# Patient Record
Sex: MALE | Race: BLACK OR AFRICAN AMERICAN | NOT HISPANIC OR LATINO | Employment: FULL TIME | URBAN - METROPOLITAN AREA
[De-identification: names, ages, dates, MRNs, and addresses within clinical notes are randomized per-mention and may not be internally consistent; named-entity substitution may affect disease eponyms.]

---

## 2017-05-12 ENCOUNTER — OFFICE VISIT (OUTPATIENT)
Dept: INTERNAL MEDICINE | Facility: CLINIC | Age: 27
End: 2017-05-12
Payer: COMMERCIAL

## 2017-05-12 VITALS
HEART RATE: 61 BPM | DIASTOLIC BLOOD PRESSURE: 78 MMHG | BODY MASS INDEX: 25.56 KG/M2 | SYSTOLIC BLOOD PRESSURE: 110 MMHG | WEIGHT: 192.88 LBS | OXYGEN SATURATION: 98 % | HEIGHT: 73 IN

## 2017-05-12 DIAGNOSIS — B36.0 TINEA VERSICOLOR: ICD-10-CM

## 2017-05-12 DIAGNOSIS — R10.9 ABDOMINAL PAIN, UNSPECIFIED LOCATION: Primary | ICD-10-CM

## 2017-05-12 DIAGNOSIS — R05.9 COUGH: ICD-10-CM

## 2017-05-12 PROCEDURE — 99213 OFFICE O/P EST LOW 20 MIN: CPT | Mod: S$GLB,,, | Performed by: NURSE PRACTITIONER

## 2017-05-12 PROCEDURE — 99999 PR PBB SHADOW E&M-EST. PATIENT-LVL III: CPT | Mod: PBBFAC,,, | Performed by: NURSE PRACTITIONER

## 2017-05-12 PROCEDURE — 1160F RVW MEDS BY RX/DR IN RCRD: CPT | Mod: S$GLB,,, | Performed by: NURSE PRACTITIONER

## 2017-05-12 RX ORDER — PANTOPRAZOLE SODIUM 20 MG/1
20 TABLET, DELAYED RELEASE ORAL
Qty: 42 TABLET | Refills: 0 | Status: SHIPPED | OUTPATIENT
Start: 2017-05-12 | End: 2017-08-29

## 2017-05-12 RX ORDER — FLUTICASONE PROPIONATE 50 MCG
1 SPRAY, SUSPENSION (ML) NASAL DAILY
Qty: 1 BOTTLE | Refills: 0 | Status: SHIPPED | OUTPATIENT
Start: 2017-05-12 | End: 2017-08-29

## 2017-05-12 RX ORDER — KETOCONAZOLE 200 MG/1
TABLET ORAL
Qty: 2 TABLET | Refills: 0 | Status: SHIPPED | OUTPATIENT
Start: 2017-05-12 | End: 2017-08-29

## 2017-05-12 RX ORDER — KETOCONAZOLE 20 MG/ML
SHAMPOO, SUSPENSION TOPICAL
Qty: 120 ML | Status: SHIPPED | OUTPATIENT
Start: 2017-05-15

## 2017-05-12 NOTE — MR AVS SNAPSHOT
[facility] - Internal Medicine  [address]  [facility] [address]  Phone: [phone]  Fax: [phone]                  Karl Bridges   2017 4:00 PM   Office Visit    Description:  Male : 1990   Provider:  Lu Carreon NP   Department:  [facility] - Internal Medicine           Reason for Visit     Abdominal Pain     Cough           Diagnoses this Visit        Comments    Abdominal pain, unspecified location    -  Primary     Tinea versicolor         Cough                To Do List           Future Appointments        Provider Department Dept Phone    2017 4:40 PM LAB, APPOINTMENT [facility] [phone]    2017 2:40 PM Jesse Tavares MD [facility] Internal Medicine [phone]      Goals (5 Years of Data)     None       These Medications        Disp Refills Start End    ketoconazole (NIZORAL) 2 % shampoo 120 mL prn 5/15/2017     Apply topically twice a week. - Topical (Top)    Pharmacy: [facility] [place] [address] & [facility] Ph #: [phone]       ketoconazole (NIZORAL) 200 mg Tab 2 tablet 0 2017     Take 2 pills at once. Exercise until sweating. Shower off after 8 hours    Pharmacy: [facility] [place] [address] & [facility] Ph #: [phone]       pantoprazole (PROTONIX) 20 MG tablet 42 tablet 0 2017    Take 1 tablet (20 mg total) by mouth 2 (two) times daily before meals. - Oral    Pharmacy: [facility] [place] [address] & [facility] Ph #: [phone]       fluticasone (FLONASE) 50 mcg/actuation nasal spray 1 Bottle 0 2017     1 spray by Each Nare route once daily. - Each Nare    Pharmacy: [facility] [place] [address] & [facility] #: [phone]         PURCHASE these  Medications (No prescription required)        Start End    Bifidobacterium infantis (ALIGN) 4 mg Cap 2017     Sig - Route: Take 1 capsule by mouth once daily. - Oral    Class: OTC      [facility] On Call     [facility] On Call Nurse Care Line -  Assistance  Unless otherwise directed by your provider, please contact [facility], our nurse care line that is available for  assistance.     Registered nurses in the [facility] On Call Center provide: appointment scheduling, clinical advisement, health education, and other advisory services.  Call: [phone] (toll free)               Medications           START taking these NEW medications        Refills    pantoprazole (PROTONIX) 20 MG tablet 0    Sig: Take 1 tablet (20 mg total) by mouth 2 (two) times daily before meals.    Class: Normal    Route: Oral    Bifidobacterium infantis (ALIGN) 4 mg Cap     Sig: Take 1 capsule by mouth once daily.    Class: OTC    Route: Oral    fluticasone (FLONASE) 50 mcg/actuation nasal spray 0    Si spray by Each Nare route once daily.    Class: Normal    Route: Each Nare      CHANGE how you are taking these medications     Start Taking Instead of    ketoconazole (NIZORAL) 2 % shampoo ketoconazole (NIZORAL) 2 % shampoo    Dosage:  Apply topically twice a week. Dosage:  Apply topically twice a week.    Reason for Change:  Reorder     Starting on: 5/15/2017            Verify that the below list of medications is an accurate representation of the medications you are currently taking.  If none reported, the list may be blank. If incorrect, please contact your healthcare provider. Carry this list with you in case of emergency.           Current Medications     ketoconazole (NIZORAL) 2 % shampoo Starting on May 15, 2017. Apply topically twice a week.    ketoconazole (NIZORAL) 200 mg Tab Take 2 pills at once. Exercise until sweating. Shower off after 8 hours    Bifidobacterium infantis (ALIGN) 4 mg Cap Take 1 capsule by mouth once  "daily.    fluticasone (FLONASE) 50 mcg/actuation nasal spray 1 spray by Each Nare route once daily.    pantoprazole (PROTONIX) 20 MG tablet Take 1 tablet (20 mg total) by mouth 2 (two) times daily before meals.           Clinical Reference Information           Your Vitals Were     BP Pulse Height Weight SpO2 BMI    110/78 (BP Location: Right arm, Patient Position: Sitting, BP Method: Manual) 61 6' 1" (1.854 m) 87.5 kg (192 lb 14.4 oz) 98% 25.45 kg/m2      Blood Pressure          Most Recent Value    BP  110/78      Allergies as of 5/12/2017     No Known Allergies      Immunizations Administered on Date of Encounter - 5/12/2017     None      Orders Placed During Today's Visit     Future Labs/Procedures Expected by Expires    Amylase  5/12/2017 7/11/2018    CBC auto differential  5/12/2017 7/11/2018    Comprehensive metabolic panel  5/12/2017 7/11/2018    Lipase  5/12/2017 7/11/2018    TSH  5/12/2017 7/11/2018      Language Assistance Services     ATTENTION: Language assistance services are available, free of charge. Please call [phone].      ATENCIÓN: Si habla español, tiene a veronica disposición servicios gratuitos de asistencia lingüística. Llame al [phone].     CATHRYN Ý: N?u b?n nói Ti?ng Vi?t, có các d?ch v? h? tr? ngôn ng? mi?n phí dành cho b?n. G?i s? [phone].         [facility] - [facility] complies with applicable Federal civil rights laws and does not discriminate on the basis of race, color, national origin, age, disability, or sex.        "

## 2017-05-12 NOTE — PROGRESS NOTES
"INTERNAL MEDICINE URGENT CARE NOTE    CHIEF COMPLAINT     Chief Complaint   Patient presents with    Abdominal Pain     x1week    Cough     x1 month       HPI     Karl Bridges is a 27 y.o. male who presents for an urgent visit today with c/o abd pain.     Abd pain- Began 1 week ago worse after weekend. Located to the center of the abd. Describes as "hunger pains". Pain constant but waxes and wanes. Denies changes with food. Denies aggravating factors. Denies relieving factors. Associated with heartburn with regurgitation. No treatment.     Cough- x 1 month . Started with URI but those s/s resolved but cough persists. Worse with abd pain. Occasional post-tussive vomiting.       Past Medical History:  History reviewed. No pertinent past medical history.    Home Medications:  Prior to Admission medications    Medication Sig Start Date End Date Taking? Authorizing Provider   ketoconazole (NIZORAL) 2 % shampoo Apply topically twice a week. 5/22/14  Yes Jagjit Barry MD   ketoconazole (NIZORAL) 200 mg Tab Take 2 pills at once. Exercise until sweating. Shower off after 8 hours 5/21/14  Yes Jagjit Barry MD       Review of Systems:  Review of Systems    Health Maintainence:   Immunizations:  Health Maintenance       Date Due Completion Date    Lipid Panel 1990 ---    TETANUS VACCINE 2/28/2010 ---    Influenza Vaccine 8/1/2017 ---           PHYSICAL EXAM     /78 (BP Location: Right arm, Patient Position: Sitting, BP Method: Manual)  Pulse 61  Ht 6' 1" (1.854 m)  Wt 87.5 kg (192 lb 14.4 oz)  SpO2 98%  BMI 25.45 kg/m2    Physical Exam   Constitutional: He appears well-developed and well-nourished. No distress.   HENT:   Head: Normocephalic.   Right Ear: No mastoid tenderness. Tympanic membrane is erythematous. Tympanic membrane is not perforated, not retracted and not bulging.   Left Ear: No mastoid tenderness. Tympanic membrane is not perforated, not retracted and not bulging. "   Mouth/Throat: Uvula is midline, oropharynx is clear and moist and mucous membranes are normal. No tonsillar exudate.   turbs swollen    Eyes: Pupils are equal, round, and reactive to light.   Neck: Normal range of motion. Neck supple. No thyromegaly present.   Cardiovascular: Normal rate, regular rhythm, normal heart sounds and intact distal pulses.    Pulmonary/Chest: Effort normal and breath sounds normal.   Abdominal: Soft. Bowel sounds are normal. He exhibits no distension and no mass. There is no tenderness. There is no rebound, no guarding, no tenderness at McBurney's point and negative Andrade's sign. No hernia.       Lymphadenopathy:     He has no cervical adenopathy.   Skin: He is not diaphoretic.   Vitals reviewed.      LABS     No results found for: LABA1C, HGBA1C  CMP  No results found for: NA, K, CL, CO2, GLU, BUN, CREATININE, CALCIUM, PROT, ALBUMIN, BILITOT, ALKPHOS, AST, ALT, ANIONGAP, ESTGFRAFRICA, EGFRNONAA  No results found for: WBC, HGB, HCT, MCV, PLT  No results found for: CHOL  No results found for: HDL  No results found for: LDLCALC  No results found for: TRIG  No results found for: CHOLHDL  No results found for: TSH, J3MPKZP, D5KBRVI, THYROIDAB    ASSESSMENT/PLAN     Karl Bridges is a 27 y.o. male with  History reviewed. No pertinent past medical history.    Abdominal pain, unspecified location- GERD vs post-viral IBS. Will start probitoic. Start pantoprazole bid x 21 days. Labs today. Phsyical exam benign. Do not lay down for at least 2 hours after eating. Eat smaller, more frequent meals. Avoid trigger foods such as red sauce, caffeine, fatty foods, carbonated beverages, alcohol. Avoid NSAIDs. Elevated HOB 30 degrees, using books or blocks.     -     CBC auto differential; Future; Expected date: 5/12/17  -     Comprehensive metabolic panel; Future; Expected date: 5/12/17  -     TSH; Future; Expected date: 5/12/17  -     Lipase; Future; Expected date: 5/12/17  -     Amylase;  Future; Expected date: 5/12/17  -     pantoprazole (PROTONIX) 20 MG tablet; Take 1 tablet (20 mg total) by mouth 2 (two) times daily before meals.  Dispense: 42 tablet; Refill: 0  -     Bifidobacterium infantis (ALIGN) 4 mg Cap; Take 1 capsule by mouth once daily.    Tinea versicolor- will restart shampoo and pill as directed.   -     ketoconazole (NIZORAL) 2 % shampoo; Apply topically twice a week.  Dispense: 120 mL; Refill: prn  -     ketoconazole (NIZORAL) 200 mg Tab; Take 2 pills at once. Exercise until sweating. Shower off after 8 hours  Dispense: 2 tablet; Refill: 0    Cough- likely AR vs 2/2 gerd. Will start floanse as directed.   -     fluticasone (FLONASE) 50 mcg/actuation nasal spray; 1 spray by Each Nare route once daily.  Dispense: 1 Bottle; Refill: 0      Follow up as needed.   Follow up with PCP in 1 year for annual     Patient education provided from [facility]. Patient was counseled on when and how to seek emergent care.       Lu MEEKS, APRN, FNP-c   Department of Internal Medicine - [facility]  4:12 PM

## 2017-05-15 ENCOUNTER — PATIENT MESSAGE (OUTPATIENT)
Dept: INTERNAL MEDICINE | Facility: CLINIC | Age: 27
End: 2017-05-15

## 2017-05-15 DIAGNOSIS — R10.9 ABDOMINAL PAIN, UNSPECIFIED LOCATION: ICD-10-CM

## 2017-08-29 ENCOUNTER — LAB VISIT (OUTPATIENT)
Dept: LAB | Facility: OTHER | Age: 27
End: 2017-08-29
Attending: INTERNAL MEDICINE
Payer: COMMERCIAL

## 2017-08-29 ENCOUNTER — OFFICE VISIT (OUTPATIENT)
Dept: INTERNAL MEDICINE | Facility: CLINIC | Age: 27
End: 2017-08-29
Payer: COMMERCIAL

## 2017-08-29 VITALS
DIASTOLIC BLOOD PRESSURE: 76 MMHG | WEIGHT: 188.94 LBS | OXYGEN SATURATION: 98 % | HEIGHT: 73 IN | BODY MASS INDEX: 25.04 KG/M2 | SYSTOLIC BLOOD PRESSURE: 110 MMHG | HEART RATE: 57 BPM

## 2017-08-29 DIAGNOSIS — Z00.00 WELLNESS EXAMINATION: ICD-10-CM

## 2017-08-29 DIAGNOSIS — Z00.00 WELLNESS EXAMINATION: Primary | ICD-10-CM

## 2017-08-29 DIAGNOSIS — Z11.3 ROUTINE SCREENING FOR STI (SEXUALLY TRANSMITTED INFECTION): ICD-10-CM

## 2017-08-29 LAB
CHOLEST/HDLC SERPL: 3 {RATIO}
HDL/CHOLESTEROL RATIO: 33.1 %
HDLC SERPL-MCNC: 142 MG/DL
HDLC SERPL-MCNC: 47 MG/DL
LDLC SERPL CALC-MCNC: 84 MG/DL
NONHDLC SERPL-MCNC: 95 MG/DL
TRIGL SERPL-MCNC: 55 MG/DL

## 2017-08-29 PROCEDURE — 87591 N.GONORRHOEAE DNA AMP PROB: CPT

## 2017-08-29 PROCEDURE — 99385 PREV VISIT NEW AGE 18-39: CPT | Mod: S$GLB,,, | Performed by: INTERNAL MEDICINE

## 2017-08-29 PROCEDURE — 86592 SYPHILIS TEST NON-TREP QUAL: CPT

## 2017-08-29 PROCEDURE — 86703 HIV-1/HIV-2 1 RESULT ANTBDY: CPT

## 2017-08-29 PROCEDURE — 99999 PR PBB SHADOW E&M-EST. PATIENT-LVL III: CPT | Mod: PBBFAC,,, | Performed by: INTERNAL MEDICINE

## 2017-08-29 PROCEDURE — 36415 COLL VENOUS BLD VENIPUNCTURE: CPT

## 2017-08-29 PROCEDURE — 80061 LIPID PANEL: CPT

## 2017-08-29 RX ORDER — PANTOPRAZOLE SODIUM 20 MG/1
20 TABLET, DELAYED RELEASE ORAL
Qty: 42 TABLET | Refills: 0 | Status: CANCELLED | OUTPATIENT
Start: 2017-08-29 | End: 2017-09-19

## 2017-08-29 NOTE — PROGRESS NOTES
Subjective:       Patient ID: Karl Bridges is a 27 y.o. male.    Chief Complaint: Annual Exam    Pt here for annual exam and feels well. Just getting over URI but feels like symptoms are nearly resolved except for mild cough.     Counseled on exercise goals.       Cough   This is a recurrent problem. The current episode started more than 1 month ago. The problem has been waxing and waning. The problem occurs constantly. The cough is non-productive and productive of brown sputum. Associated symptoms include headaches and a sore throat. Pertinent negatives include no chest pain, fever, rash, rhinorrhea or shortness of breath. Nothing aggravates the symptoms. He has tried OTC cough suppressant for the symptoms. The treatment provided moderate relief.     Review of Systems   Constitutional: Negative for fatigue, fever and unexpected weight change.   HENT: Positive for sore throat. Negative for congestion and rhinorrhea.    Eyes: Negative for visual disturbance.   Respiratory: Positive for cough. Negative for shortness of breath.    Cardiovascular: Negative for chest pain, palpitations and leg swelling.   Gastrointestinal: Negative for abdominal pain, blood in stool, constipation and diarrhea.   Genitourinary: Negative for difficulty urinating and dysuria.   Skin: Negative for color change and rash.   Neurological: Positive for headaches. Negative for dizziness and syncope.   Hematological: Negative for adenopathy.   Psychiatric/Behavioral: Negative for dysphoric mood.       Objective:      Physical Exam   Constitutional: He is oriented to person, place, and time. He appears well-developed and well-nourished.   HENT:   Head: Normocephalic and atraumatic.   Right Ear: External ear normal.   Left Ear: External ear normal.   Mouth/Throat: Oropharynx is clear and moist. No oropharyngeal exudate.   Eyes: Conjunctivae and EOM are normal. Pupils are equal, round, and reactive to light.   Neck: Neck supple.  Carotid bruit is not present. No thyromegaly present.   Cardiovascular: Normal rate, regular rhythm, S1 normal, S2 normal, normal heart sounds and intact distal pulses.    Pulmonary/Chest: Effort normal and breath sounds normal.   Abdominal: Soft. Bowel sounds are normal. He exhibits no mass. There is no hepatosplenomegaly. There is no tenderness.   Musculoskeletal: He exhibits no edema.   Lymphadenopathy:     He has no cervical adenopathy.   Neurological: He is alert and oriented to person, place, and time. No cranial nerve deficit.   Psychiatric: He has a normal mood and affect. His behavior is normal.       Assessment:       1. Wellness examination    2. Routine screening for STI (sexually transmitted infection)        Plan:       1. Appropriate labs

## 2017-08-30 LAB
C TRACH DNA SPEC QL NAA+PROBE: NOT DETECTED
HIV 1+2 AB+HIV1 P24 AG SERPL QL IA: NEGATIVE
N GONORRHOEA DNA SPEC QL NAA+PROBE: NOT DETECTED
RPR SER QL: NORMAL

## 2018-01-10 ENCOUNTER — OFFICE VISIT (OUTPATIENT)
Dept: URGENT CARE | Facility: CLINIC | Age: 28
End: 2018-01-10

## 2018-01-10 VITALS
HEIGHT: 73 IN | DIASTOLIC BLOOD PRESSURE: 81 MMHG | HEART RATE: 89 BPM | WEIGHT: 176 LBS | BODY MASS INDEX: 23.33 KG/M2 | TEMPERATURE: 99 F | OXYGEN SATURATION: 98 % | SYSTOLIC BLOOD PRESSURE: 133 MMHG

## 2018-01-10 DIAGNOSIS — J32.9 SINUSITIS, UNSPECIFIED CHRONICITY, UNSPECIFIED LOCATION: Primary | ICD-10-CM

## 2018-01-10 DIAGNOSIS — R09.81 SINUS CONGESTION: ICD-10-CM

## 2018-01-10 PROCEDURE — 96372 THER/PROPH/DIAG INJ SC/IM: CPT | Mod: S$GLB,,, | Performed by: EMERGENCY MEDICINE

## 2018-01-10 PROCEDURE — 99203 OFFICE O/P NEW LOW 30 MIN: CPT | Mod: 25,S$GLB,, | Performed by: EMERGENCY MEDICINE

## 2018-01-10 RX ORDER — BETAMETHASONE SODIUM PHOSPHATE AND BETAMETHASONE ACETATE 3; 3 MG/ML; MG/ML
9 INJECTION, SUSPENSION INTRA-ARTICULAR; INTRALESIONAL; INTRAMUSCULAR; SOFT TISSUE
Status: COMPLETED | OUTPATIENT
Start: 2018-01-10 | End: 2018-01-10

## 2018-01-10 RX ORDER — AMOXICILLIN AND CLAVULANATE POTASSIUM 875; 125 MG/1; MG/1
1 TABLET, FILM COATED ORAL 2 TIMES DAILY
Qty: 14 TABLET | Refills: 0 | Status: SHIPPED | OUTPATIENT
Start: 2018-01-10 | End: 2018-01-17

## 2018-01-10 RX ADMIN — BETAMETHASONE SODIUM PHOSPHATE AND BETAMETHASONE ACETATE 9 MG: 3; 3 INJECTION, SUSPENSION INTRA-ARTICULAR; INTRALESIONAL; INTRAMUSCULAR; SOFT TISSUE at 01:01

## 2018-01-10 NOTE — PROGRESS NOTES
"Subjective:       Patient ID: Karl Bridges is a 27 y.o. male.    Vitals:  height is 6' 1" (1.854 m) and weight is 79.8 kg (176 lb). His oral temperature is 98.9 °F (37.2 °C). His blood pressure is 133/81 and his pulse is 89. His oxygen saturation is 98%.     Chief Complaint: Influenza    Pt also took Theraflu      Influenza   This is a new problem. The current episode started 1 to 4 weeks ago. The problem occurs daily. The problem has been gradually worsening. Associated symptoms include chills, congestion, coughing, fatigue, a fever, headaches, myalgias and a sore throat. Pertinent negatives include no abdominal pain, nausea or vomiting. He has tried NSAIDs for the symptoms. The treatment provided no relief.     Review of Systems   Constitution: Positive for chills, fatigue, fever, malaise/fatigue and night sweats.   HENT: Positive for congestion, ear pain (left), hoarse voice and sore throat.    Cardiovascular: Negative for dyspnea on exertion and leg swelling.   Respiratory: Positive for cough. Negative for shortness of breath, sputum production and wheezing.    Musculoskeletal: Positive for back pain (more than normal) and myalgias.   Gastrointestinal: Negative for abdominal pain, diarrhea, nausea and vomiting.   Neurological: Positive for headaches.       Objective:      Physical Exam   Constitutional: He is oriented to person, place, and time. He appears well-developed and well-nourished. He is cooperative.  Non-toxic appearance. He does not appear ill. No distress.   HENT:   Head: Normocephalic and atraumatic.   Right Ear: Hearing, tympanic membrane, external ear and ear canal normal.   Left Ear: Hearing, tympanic membrane, external ear and ear canal normal.   Nose: Mucosal edema and rhinorrhea present. No nasal deformity. No epistaxis. Right sinus exhibits frontal sinus tenderness (Mild tenderness.). Right sinus exhibits no maxillary sinus tenderness. Left sinus exhibits no maxillary sinus " tenderness and no frontal sinus tenderness.   Mouth/Throat: Uvula is midline and mucous membranes are normal. No trismus in the jaw. Normal dentition. No uvula swelling. Posterior oropharyngeal erythema present.   Eyes: Conjunctivae and lids are normal. Right eye exhibits no discharge. Left eye exhibits no discharge. No scleral icterus.   Sclera clear bilat   Neck: Trachea normal, normal range of motion, full passive range of motion without pain and phonation normal. Neck supple.   Few anterior nodes felt.   Cardiovascular: Normal rate, regular rhythm, normal heart sounds, intact distal pulses and normal pulses.    Pulmonary/Chest: Effort normal and breath sounds normal. No respiratory distress.   Abdominal: Soft. Normal appearance. He exhibits no distension, no pulsatile midline mass and no mass. There is no tenderness.   Musculoskeletal: Normal range of motion. He exhibits no edema or deformity.   Neurological: He is alert and oriented to person, place, and time. He exhibits normal muscle tone. Coordination normal.   Skin: Skin is warm, dry and intact. He is not diaphoretic. No pallor.   Psychiatric: He has a normal mood and affect. His speech is normal and behavior is normal. Judgment and thought content normal. Cognition and memory are normal.   Nursing note and vitals reviewed.      Assessment:       1. Sinusitis, unspecified chronicity, unspecified location    2. Sinus congestion        Plan:         Sinusitis, unspecified chronicity, unspecified location  -     betamethasone acetate-betamethasone sodium phosphate injection 9 mg; Inject 1.5 mLs (9 mg total) into the muscle one time.  -     amoxicillin-clavulanate 875-125mg (AUGMENTIN) 875-125 mg per tablet; Take 1 tablet by mouth 2 (two) times daily.  Dispense: 14 tablet; Refill: 0    Sinus congestion  -     POCT Influenza A/B

## 2018-01-10 NOTE — PATIENT INSTRUCTIONS
Understanding Your Sinuses  Your sinuses are air-filled spaces between the bones in your head. They have small openings that connect to the nasal cavity. The sinuses make mucus that drains into the nose. This helps keep the nose moist and free of dust and germs.      Parts of the nasal cavity  · The septum is the wall of cartilage and bone in the center of the nasal cavity.  · The middle meatus is the intersection between the sinuses.  · Turbinates are ridges on the sides of the nasal cavity.  Cilia keep sinuses clear    Air circulates freely though healthy sinuses. Tiny, hairlike structures called cilia line the sinuses. Cilia move the thin, watery mucus through the sinuses and into the nose. Sinuses are healthy when they drain freely. Sinus drainage can be blocked if the sinus lining is swollen or if mucus is too thick. Cilia that are damaged or dont work correctly can also lead to problems with drainage.  Date Last Reviewed: 10/1/2016  © 6570-7728 [employer]. [address] [place] [address], [place] [address]. All rights reserved. This information is not intended as a substitute for professional medical care. Always follow your healthcare professional's instructions.

## 2018-01-10 NOTE — LETTER
January 10, 2018      [facility] [place] [address], [address]  [facility] [address]  Phone: [phone]  Fax: [phone]       Patient: Karl Bridges   YOB: 1990  Date of Visit: 01/10/2018    To Whom It May Concern:    Steven Bridges  was at [facility] on 01/10/2018. He may return to work/school on 01/11/2018 with no restrictions. If you have any questions or concerns, or if I can be of further assistance, please do not hesitate to contact me.    Sincerely,    Reema Baum, RT